# Patient Record
Sex: FEMALE | Race: WHITE
[De-identification: names, ages, dates, MRNs, and addresses within clinical notes are randomized per-mention and may not be internally consistent; named-entity substitution may affect disease eponyms.]

---
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
## 2020-06-18 ENCOUNTER — HOSPITAL ENCOUNTER (EMERGENCY)
Age: 20
Discharge: HOME | End: 2020-06-18
Payer: COMMERCIAL

## 2020-06-18 VITALS
DIASTOLIC BLOOD PRESSURE: 73 MMHG | TEMPERATURE: 98.6 F | HEART RATE: 88 BPM | OXYGEN SATURATION: 98 % | RESPIRATION RATE: 16 BRPM | SYSTOLIC BLOOD PRESSURE: 144 MMHG

## 2020-06-18 VITALS — OXYGEN SATURATION: 100 % | SYSTOLIC BLOOD PRESSURE: 105 MMHG | DIASTOLIC BLOOD PRESSURE: 61 MMHG | HEART RATE: 68 BPM

## 2020-06-18 VITALS
SYSTOLIC BLOOD PRESSURE: 107 MMHG | DIASTOLIC BLOOD PRESSURE: 62 MMHG | RESPIRATION RATE: 16 BRPM | OXYGEN SATURATION: 100 % | HEART RATE: 71 BPM

## 2020-06-18 VITALS — BODY MASS INDEX: 23.8 KG/M2

## 2020-06-18 DIAGNOSIS — R10.31: ICD-10-CM

## 2020-06-18 DIAGNOSIS — N39.0: Primary | ICD-10-CM

## 2020-06-18 DIAGNOSIS — N83.202: ICD-10-CM

## 2020-06-18 LAB
ADD MANUAL DIFF / SLIDE REVIEW: NO
ALBUMIN SERPL-MCNC: 4.2 G/DL (ref 3.5–5)
ALBUMIN/GLOB SERPL: 1.4 {RATIO} (ref 1–2.8)
ALP SERPL-CCNC: 47 U/L (ref 38–126)
ALT SERPL-CCNC: 14 IU/L (ref ?–35)
AMYLASE SERPL-CCNC: 106 U/L (ref 30–110)
APPEARANCE UR: CLEAR
BILIRUBIN URINE UA: NEGATIVE
BUN SERPL-MCNC: 12 MG/DL (ref 7–17)
CALCIUM SERPL-MCNC: 9.6 MG/DL (ref 8.4–10.2)
CHLORIDE SERPL-SCNC: 107 MMOL/L (ref 98–107)
CO2 SERPL-SCNC: 23 MMOL/L (ref 22–32)
COLOR UR: YELLOW
CULTURE INDICATED URINE: (no result)
ESTIMATED GLOMERULAR FILT RATE: > 60 ML/MIN (ref 60–?)
GLOBULIN SER CALC-MCNC: 2.9 G/DL (ref 1.7–4.1)
GLUCOSE SERPL-MCNC: 84 MG/DL (ref 70–100)
GLUCOSE URINE UA: NEGATIVE G/DL
HEMATOCRIT: 39.7 % (ref 36–46)
HEMOGLOBIN: 14.4 G/DL (ref 12–16)
HEMOLYSIS: < 15 (ref 0–50)
HGB UR QL: (no result)
KETONES URINE UA: NEGATIVE
LEUKOCYTE ESTERASE URINE UA: NEGATIVE
LIPASE SERPL-CCNC: 89 U/L (ref 23–300)
LYMPHOCYTES # SPEC AUTO: 2000 /UL (ref 1100–4500)
MCV RBC: 89.8 FL (ref 80–100)
MEAN CORPUSCULAR HEMOGLOBIN: 32.5 PG (ref 26–34)
MEAN CORPUSCULAR HGB CONC: 36.2 % (ref 30–36)
NITRITE URINE UA: NEGATIVE
PH UR: 6 [PH] (ref 4.5–8)
PLATELET COUNT: 187 X10^3/UL (ref 150–400)
POTASSIUM SERPL-SCNC: 4.2 MMOL/L (ref 3.4–5.1)
PROT SERPL-MCNC: 7.1 G/DL (ref 6.3–8.2)
PROTEIN URINE UA: NEGATIVE
SODIUM SERPL-SCNC: 137 MMOL/L (ref 137–145)
SP GR UR: <=1.005 (ref 1–1.03)
UROBILINOGEN UR QL: 0.2 E.U./DL

## 2020-06-18 PROCEDURE — 84703 CHORIONIC GONADOTROPIN ASSAY: CPT

## 2020-06-18 PROCEDURE — 80053 COMPREHEN METABOLIC PANEL: CPT

## 2020-06-18 PROCEDURE — 85025 COMPLETE CBC W/AUTO DIFF WBC: CPT

## 2020-06-18 PROCEDURE — 76830 TRANSVAGINAL US NON-OB: CPT

## 2020-06-18 PROCEDURE — 76856 US EXAM PELVIC COMPLETE: CPT

## 2020-06-18 PROCEDURE — 87086 URINE CULTURE/COLONY COUNT: CPT

## 2020-06-18 PROCEDURE — 96361 HYDRATE IV INFUSION ADD-ON: CPT

## 2020-06-18 PROCEDURE — 81001 URINALYSIS AUTO W/SCOPE: CPT

## 2020-06-18 PROCEDURE — 83690 ASSAY OF LIPASE: CPT

## 2020-06-18 PROCEDURE — 36415 COLL VENOUS BLD VENIPUNCTURE: CPT

## 2020-06-18 PROCEDURE — 81003 URINALYSIS AUTO W/O SCOPE: CPT

## 2020-06-18 PROCEDURE — 99284 EMERGENCY DEPT VISIT MOD MDM: CPT

## 2020-06-18 PROCEDURE — 87077 CULTURE AEROBIC IDENTIFY: CPT

## 2020-06-18 PROCEDURE — 96374 THER/PROPH/DIAG INJ IV PUSH: CPT

## 2020-06-18 PROCEDURE — 96375 TX/PRO/DX INJ NEW DRUG ADDON: CPT

## 2020-06-18 PROCEDURE — 82150 ASSAY OF AMYLASE: CPT

## 2020-06-18 NOTE — ED.FEMALEGU
"HPI - Female Genitourinary
<Rachel Parmer, FNP-BC - Last Filed: 06/18/20 15:01>
General
Chief complaint: Urogenital-Female
Stated complaint: bladder pain
Time Seen by Provider: 06/18/20 11:30
Source: patient
Mode of arrival: Wheelchair
Limitations: no limitations
History of Present Illness
HPI Narrative: The patient is a 20-year-old female nonsmoker who presents with her fiance with chief complaint of bladder pain that started at 10:30 a.m..  She states that her pain is focused in the right lower quadrant right side of her abdomen.  
It started when she woke up in urinated, then she had severe bladder spasm she could not get off the toilet.  She tried to take a warm shower to see if that would help and it did not help.  She complains of nausea, no vomiting.  No fevers.  She 
states she feels like she needs to keep urinating even when she is done.  She denies any dysuria but complains of bladder spasm after urination.  She denies any flank pain.  She states she does have a history of endometriosis, but never has had the 
diagnosis confirmed by surgery.  She denies any history of abdominal surgeries.  She denies any vaginal bleeding vaginal discharge or vaginal itching.  She denies any concern of sexually transmitted infections.  She has never felt this way before.

The patient states she has never been sexually active.  She recently saw an OBGYN within the past week to talk about IUD insertion.  The patient was tested for gonorrhea and chlamydia pre IUD insertion per protocol.  She states she is negative.
Related Data
Previous Rx's

 Medication  Instructions  Recorded
ketorolac 10 mg PO TID PRN #14 tab 06/18/20
nitrofurantoin macrocrystal 100 mg PO BID #14 cap 06/18/20
ondansetron 4 mg PO Q6H PRN #20 tab 06/18/20


Allergies

Allergy/AdvReac Type Severity Reaction Status Date / Time
No Known Drug Allergies Allergy   Verified 06/18/20 11:38



Review of Systems
<Rachel Parmer, FNP-BC - Last Filed: 06/18/20 15:01>
Review of Systems
Narrative: GENERAL: Denies chills, fatigue, malaise, fever, sweats. 
HEENT: Denies sinus pain, ear pain, sore throat, difficulty swallowing, dizziness.
RESPIRATORY: Denies dyspnea, cough, wheezing, hemoptysis, sputum.
CARDIOVASCULAR: Denies chest pain, palpitations, orthopnea, edema, 
GASTROINTESTINAL:  See HPI
:  See HPI
MUSCULOSKELETAL: denies weakness, joint pain, or bony pain
SKIN: Denies rash, skin lesions, or other
NEUROLOGIC: Denies weakness, headache, numbness, change in speech, confusion, seizures, incoordination.
PSYCHIATRIC: No concerning psychosocial issues.


12 point review of systems is negative except for those stated above

Patient History
<Rachel Parmer, FNP-BC - Last Filed: 06/18/20 15:01>
alcohol intake frequency: 0-2 drinks per day
Substance Use Type: does not use

Exam
<Rachel Parmer, FNP-BC - Last Filed: 06/18/20 15:01>
Narrative
Exam Narrative: GENERAL: This is a well-nourished, well-developed patient, appears uncomfortable
HEAD: Atraumatic. Normocephalic. No temporal or scalp tenderness.
EYES: Pupils equal round and reactive. Extraocular motions intact. No scleral icterus. No injection or drainage. 
ENT: Nose without bleeding, purulent drainage or septal hematoma. Throat without erythema, tonsillar hypertrophy or exudate. Uvula midline. Airway patent.
NECK: Trachea midline. No JVD or lymphadenopathy. Supple, nontender, no meningeal signs.
CARDIOVASCULAR: Regular rate and rhythm
RESPIRATORY: Clear to auscultation. Breath sounds equal bilaterally. No wheezes, rales, or rhonchi.  No cough.  No increased respiratory effort.  No accessory muscle use.
GASTROINTESTINAL: Abdomen soft, diffusely tender, nondistended. No hepato-splenomegaly, or palpable masses. No guarding.  Negative Hardy sign.  Right lower quadrant tenderness to palpation with no guarding.  Negative peritoneal signs.
EXTREMITIES: No clubbing, cyanosis, or edema. No joint tenderness, effusion, or edema noted. 
BACK: Nontender without deformity or crep
[id number]|2020-06-18 18:08:20|2020-06-18 18:08:20|PM.NBHP.1||||"Birth History

## 2020-06-18 NOTE — DI.US.S_ITS
PROCEDURE:  US PELVIC COMPLETE  
   
INDICATIONS:  PAIN  
   
TECHNIQUE:    
Real-time scanning was performed of the pelvic organs, with image documentation.    
Additional endovaginal scanning was necessary due to incomplete visualization of the   
adnexal and endometrial structures by transabdominal scanning.    
   
COMPARISON:  None.  
   
FINDINGS:    
Transabdominal scanning:  Limited scanning through the kidneys shows no hydronephrosis.    
There is a proximally 60 cc of free fluid deep within the posterior cul-de-sac,   
containing low-level internal echoes, likely reflecting rupture of a hemorrhagic ovarian   
cyst  
   
Endovaginal scanning:    
Uterus:  Uterus is normal in size at 4.4 x 5.3 x 7.1 cm, retroverted.  The endometrium   
measures 4.9 mm in combined thickness.    
   
Ovaries: The right ovary measures 1.6 x 1.7 x 3.0 cm and the left measures 1.5 x 1.8 x   
4.2 cm with a involuting cyst measuring 1.1 x 0.9 x 3.3 cm.  
   
   
IMPRESSION: Involuting presumably ruptured ovarian cyst left ovary, with the finding of   
nearby free fluid deep within the cul-de-sac containing low level internal echoes.  The   
appearance likely is secondary to rupture of an ovarian cyst that is hemorrhagic.  Please   
correlate for presence or absence of pregnancy (the low-level echoes within the free   
fluid could indicate hemorrhage from ectopic pregnancy).  Close clinical followup is   
recommended given this finding.  
   
   
   
Dictated by: Rory Frey M.D. on 6/18/2020 at 13:20       
Approved by: Rory Frey M.D. on 6/18/2020 at 13:23